# Patient Record
Sex: MALE | Race: WHITE | NOT HISPANIC OR LATINO
[De-identification: names, ages, dates, MRNs, and addresses within clinical notes are randomized per-mention and may not be internally consistent; named-entity substitution may affect disease eponyms.]

---

## 2022-07-22 ENCOUNTER — TRANSCRIPTION ENCOUNTER (OUTPATIENT)
Age: 77
End: 2022-07-22

## 2022-09-03 ENCOUNTER — TRANSCRIPTION ENCOUNTER (OUTPATIENT)
Age: 77
End: 2022-09-03

## 2022-09-12 PROBLEM — Z00.00 ENCOUNTER FOR PREVENTIVE HEALTH EXAMINATION: Status: ACTIVE | Noted: 2022-09-12

## 2022-09-15 ENCOUNTER — APPOINTMENT (OUTPATIENT)
Dept: PULMONOLOGY | Facility: CLINIC | Age: 77
End: 2022-09-15

## 2022-09-15 VITALS
RESPIRATION RATE: 18 BRPM | SYSTOLIC BLOOD PRESSURE: 120 MMHG | HEART RATE: 103 BPM | BODY MASS INDEX: 22.54 KG/M2 | HEIGHT: 71 IN | DIASTOLIC BLOOD PRESSURE: 60 MMHG | WEIGHT: 161 LBS | OXYGEN SATURATION: 86 % | TEMPERATURE: 97.5 F

## 2022-09-15 DIAGNOSIS — Z87.440 PERSONAL HISTORY OF URINARY (TRACT) INFECTIONS: ICD-10-CM

## 2022-09-15 DIAGNOSIS — Z87.39 PERSONAL HISTORY OF OTHER DISEASES OF THE MUSCULOSKELETAL SYSTEM AND CONNECTIVE TISSUE: ICD-10-CM

## 2022-09-15 DIAGNOSIS — Z86.79 PERSONAL HISTORY OF OTHER DISEASES OF THE CIRCULATORY SYSTEM: ICD-10-CM

## 2022-09-15 DIAGNOSIS — Z86.39 PERSONAL HISTORY OF OTHER ENDOCRINE, NUTRITIONAL AND METABOLIC DISEASE: ICD-10-CM

## 2022-09-15 DIAGNOSIS — R09.02 HYPOXEMIA: ICD-10-CM

## 2022-09-15 PROCEDURE — 99204 OFFICE O/P NEW MOD 45 MIN: CPT

## 2022-09-15 NOTE — ASSESSMENT
[FreeTextEntry1] : Patient with mild hypoxemia. Current sats 88-90 on room air at rest. The chest x-ray on 829 was clear. I suspect we may be dealing with an aspiration syndrome. I would repeat a chest x-ray at the nursing home. I would consider using thickening to liquids. Recommendations have been sent to the nursing home. Patient is to use supplemental oxygen for sats less than 90.

## 2022-09-15 NOTE — PLAN
[FreeTextEntry1] : Supplemental oxygen to keep sats greater than or equal to 90. Consider adding thickener to liquids. Repeat chest x-ray.

## 2022-09-15 NOTE — HISTORY OF PRESENT ILLNESS
[FreeTextEntry1] : Evaluation for hypoxemia. [de-identified] : This is a 76-year-old male, nursing home resident nonsmoker with a history of severe dementia. He was discharged from the hospital on September 1 after an admission for urinary tract infection, wound infections and acute kidney injury. Apparently he was not on oxygen prior to the hospitalization. He was discharged on oxygen and remains on oxygen at the nursing home. There is no obvious difficulty breathing at the nursing home. He has an occasional cough. A chest x-ray on August 29 was clear a CAT scan of the abdomen and pelvis revealed a tiny left pleural effusion with an area of adjacent atelectasis. There is no clear history of heart problems or prior lung problems. He is currently on a puréed diet with thin liquids. His wife notes that he does have trouble swallowing. He has a history of dementia for many years. He is oriented x1. Current O2 sat 88-90 on room air at rest.

## 2022-09-15 NOTE — PHYSICAL EXAM
[No Acute Distress] : no acute distress [Well Developed] : well developed [Well-Appearing] : well-appearing [Normal Sclera/Conjunctiva] : normal sclera/conjunctiva [PERRL] : pupils equal round and reactive to light [EOMI] : extraocular movements intact [Normal Outer Ear/Nose] : the outer ears and nose were normal in appearance [Normal Oropharynx] : the oropharynx was normal [No JVD] : no jugular venous distention [No Lymphadenopathy] : no lymphadenopathy [Supple] : supple [Thyroid Normal, No Nodules] : the thyroid was normal and there were no nodules present [No Respiratory Distress] : no respiratory distress  [No Accessory Muscle Use] : no accessory muscle use [Clear to Auscultation] : lungs were clear to auscultation bilaterally [Normal Rate] : normal rate  [Regular Rhythm] : with a regular rhythm [Normal S1, S2] : normal S1 and S2 [No Murmur] : no murmur heard [No Carotid Bruits] : no carotid bruits [No Abdominal Bruit] : a ~M bruit was not heard ~T in the abdomen [No Varicosities] : no varicosities [Pedal Pulses Present] : the pedal pulses are present [No Edema] : there was no peripheral edema [No Palpable Aorta] : no palpable aorta [No Extremity Clubbing/Cyanosis] : no extremity clubbing/cyanosis [Soft] : abdomen soft [Non Tender] : non-tender [Non-distended] : non-distended [No Masses] : no abdominal mass palpated [No HSM] : no HSM [Normal Bowel Sounds] : normal bowel sounds [Normal Posterior Cervical Nodes] : no posterior cervical lymphadenopathy [Normal Anterior Cervical Nodes] : no anterior cervical lymphadenopathy [de-identified] : chronically ill appearing [de-identified] : in wheelchair- oriented x 1

## 2022-09-15 NOTE — REVIEW OF SYSTEMS
[Cough] : cough [Unsteady Walk] : ataxia [Memory Loss] : memory loss [Negative] : Heme/Lymph [FreeTextEntry8] : bro in place

## 2022-10-05 ENCOUNTER — TRANSCRIPTION ENCOUNTER (OUTPATIENT)
Age: 77
End: 2022-10-05